# Patient Record
(demographics unavailable — no encounter records)

---

## 2024-11-22 NOTE — PHYSICAL EXAM
[No Acute Distress] : no acute distress [Well Nourished] : well nourished [Well Developed] : well developed [Well-Appearing] : well-appearing [Normal Sclera/Conjunctiva] : normal sclera/conjunctiva [PERRL] : pupils equal round and reactive to light [EOMI] : extraocular movements intact [Normal Oropharynx] : the oropharynx was normal [Supple] : supple [Thyroid Normal, No Nodules] : the thyroid was normal and there were no nodules present [No Respiratory Distress] : no respiratory distress  [No Accessory Muscle Use] : no accessory muscle use [Clear to Auscultation] : lungs were clear to auscultation bilaterally [Normal Rate] : normal rate  [Regular Rhythm] : with a regular rhythm [Normal S1, S2] : normal S1 and S2 [No Murmur] : no murmur heard [No Edema] : there was no peripheral edema [Soft] : abdomen soft [Non Tender] : non-tender [Non-distended] : non-distended [No Masses] : no abdominal mass palpated [Normal Bowel Sounds] : normal bowel sounds [No CVA Tenderness] : no CVA  tenderness [No Spinal Tenderness] : no spinal tenderness [Grossly Normal Strength/Tone] : grossly normal strength/tone [No Rash] : no rash [Normal Gait] : normal gait [Alert and Oriented x3] : oriented to person, place, and time [de-identified] : r arm incision site dry clean intact; csectin incision dry clean intact

## 2024-11-22 NOTE — HISTORY OF PRESENT ILLNESS
[FreeTextEntry1] : nexplanon insertion f/u  [de-identified] : 36F  delivered via pCS at 34wk due to PPROM and transverse breech, pregnancy complicated by GDMA1 and recurrent UTI. Pt delivered via primary C/S on 2024. Baby girl weighed 2170g (4lbs 12oz) APGARS 9/9 sent to NICU due to prematurity. Post partum course complicated with Rapid response the night of  due to hypotension, SOB and hgb 7.2. CT a/p showed 10cm hematomas in b/l rectus sheath, no active bleeding. Pt received 2u pRBC with resolution of symptoms, Hgb 8.4, and the next day repeat Hgb 9.5.  2024 Pt was followed up for initial postpartum visit on 2024. The repeat Hgb was 11.8 (24). The wound healing well, but distension was noted 2/2 hematoma. Urine culture was negative. Pt wanting Nexplanon.  2024 Today in the clinic, pt reports she and her baby doing well. Has good support from  taking care of the baby. Reports  Incision site without pain, discharge, warmth, redness. Reports few episodes of mild dizziness. Reports improvement of vaginal bleeding (lochia) as it is occurring less frequently and in smaller amounts. Mild abdominal pain improving. Pt c/o canker sore which has improved with "Magic Mouthwash" in the past. Denies feeling depressed, SI, HI. Denies fever, chills, n/v, dysuria, urinary frequency/urgency. Pt no longer wants Nexplanon.  24: Reports doing well. Baby also doing well. Baby gaining wt appropriately. Has great support from the . Still has mild discomfort in the abdomen since the c section. Pt also c/o spider veins on thigh bilaterally associated with intermittent pain which worsens with long standing. Pt also reports of veins bulging which is also associated with pain on her leg bilaterally that worsens with long standing. Denies feeling depressed, SI, HI. Denies fever, chills, n/v, dysuria, urinary frequency/urgency. Still undecisive of contraceptive method.  10/1/24 Breast feeding + formula feeding. Good support from . Pt interested in Nexplanon for contraceptive method. Pt report wt gain with depo and complication with IUD (persistent BV). Pt now interested in Nexplanon. Mild abdominal discomfort after lifting baby car seat. Also c/o left wrist pain + swelling x 2 weeks. Pt has not completed the 2hr glc test. Continues to c/o symptomatic spider vein, compression stockings not helping. Report the bilateral leg pain is severe with prolong standing. Denies fever, chills, n/v, urinary frequency/urgency, dysuria.  24. Uncomplicated insertion of nexplanon of right arm.   24: notes no complication post nexplanon insertion. pt reports intermittent dizziness. did not complete the 2hr glc tolerance yet.

## 2024-11-22 NOTE — REVIEW OF SYSTEMS
[Dizziness] : dizziness [Negative] : Heme/Lymph [Chest Pain] : no chest pain [Palpitations] : no palpitations [Lower Ext Edema] : no lower extremity edema [Orthopnea] : no orthopnea [Paroxysmal Nocturnal Dyspnea] : no paroxysmal nocturnal dyspnea [Headache] : no headache [Fainting] : no fainting [Confusion] : no confusion [Unsteady Walking] : no ataxia [FreeTextEntry5] : +spider vein

## 2025-02-19 NOTE — HISTORY OF PRESENT ILLNESS
[FreeTextEntry1] : CPE [de-identified] : 36F  with recent OB hx of pCS (24) at 34wks due to PPROM and transverse breech c/b hematomas in b/l rectus sheath requiring 2 units of blood, pregnancy complicated by GDMA1 and recurrent UTI & BV here for CPE. s/p Nexplanon insertion in 2024. Pt has not completed 2hr glc tolerance test.

## 2025-02-19 NOTE — HISTORY OF PRESENT ILLNESS
[FreeTextEntry1] : CPE [de-identified] : 36F  with recent OB hx of pCS (24) at 34wks due to PPROM and transverse breech c/b hematomas in b/l rectus sheath requiring 2 units of blood, pregnancy complicated by GDMA1 and recurrent UTI & BV here for CPE. s/p Nexplanon insertion in 2024. Pt has not completed 2hr glc tolerance test.

## 2025-02-20 NOTE — HISTORY OF PRESENT ILLNESS
[FreeTextEntry1] : abdominal pain f/u  [de-identified] : 36F  with recent OB hx of pCS (24) at 34wks due to PPROM and transverse breech c/b hematomas in b/l rectus sheath requiring 2 units of blood, pregnancy complicated by GDMA1 and recurrent UTI & BV here for:  #abdominal pain: pt reports she continues to have abdominal pain since the csection. the pain comes and goes but it becomes severe enough to affect daily activity. pt reports the pain is worse when lifting heavy objects. No warmth, TTP, or discharge on or around the incision site of c section. Denies n/v, fever, chills, constipation, dark/bloody stool.   #hematuria: pt reports few episodes of bloody urine associated with urinary frequency.  no colicky pain. no dysuria. no urgency.   #b/l wrist pain: reports wrist pain since the delivery in . pain is worse after holding the baby.   #lower back pain: pt reports of worsening lower back pain past few weeks. pt reports she had a falling accident 15 years ago and was told that she had a disc protrusion and was recommended for surgery which pt refused at the time. pt has been having intermittent lower back pain since the accident, but it worsened after the delivery . Pt has been taking tylenol 650mg twice a day but hasn't helped much. Denies saddle anesthesia, bladder/bowel incontinence, LE weakness.   #nexplanon in place: pt desires to have nexplanon removed as she is unhappy with wt gain.

## 2025-02-20 NOTE — REVIEW OF SYSTEMS
[Recent Change In Weight] : ~T recent weight change [Abdominal Pain] : abdominal pain [Hematuria] : hematuria [Frequency] : frequency [Back Pain] : back pain [Fever] : no fever [Chills] : no chills [Discharge] : no discharge [Pain] : no pain [Vision Problems] : no vision problems [Nasal Discharge] : no nasal discharge [Sore Throat] : no sore throat [Postnasal Drip] : no postnasal drip [Chest Pain] : no chest pain [Palpitations] : no palpitations [Lower Ext Edema] : no lower extremity edema [Shortness Of Breath] : no shortness of breath [Cough] : no cough [Dyspnea on Exertion] : no dyspnea on exertion [Nausea] : no nausea [Constipation] : no constipation [Diarrhea] : diarrhea [Vomiting] : no vomiting [Heartburn] : no heartburn [Melena] : no melena [Dysuria] : no dysuria [Incontinence] : no incontinence [Nocturia] : no nocturia [Vaginal Discharge] : no vaginal discharge [Itching] : no itching [Skin Rash] : no skin rash [Headache] : no headache [FreeTextEntry9] : +b/l wrist pain

## 2025-02-20 NOTE — HISTORY OF PRESENT ILLNESS
[FreeTextEntry1] : abdominal pain f/u  [de-identified] : 36F  with recent OB hx of pCS (24) at 34wks due to PPROM and transverse breech c/b hematomas in b/l rectus sheath requiring 2 units of blood, pregnancy complicated by GDMA1 and recurrent UTI & BV here for:  #abdominal pain: pt reports she continues to have abdominal pain since the csection. the pain comes and goes but it becomes severe enough to affect daily activity. pt reports the pain is worse when lifting heavy objects. No warmth, TTP, or discharge on or around the incision site of c section. Denies n/v, fever, chills, constipation, dark/bloody stool.   #hematuria: pt reports few episodes of bloody urine associated with urinary frequency.  no colicky pain. no dysuria. no urgency.   #b/l wrist pain: reports wrist pain since the delivery in . pain is worse after holding the baby.   #lower back pain: pt reports of worsening lower back pain past few weeks. pt reports she had a falling accident 15 years ago and was told that she had a disc protrusion and was recommended for surgery which pt refused at the time. pt has been having intermittent lower back pain since the accident, but it worsened after the delivery . Pt has been taking tylenol 650mg twice a day but hasn't helped much. Denies saddle anesthesia, bladder/bowel incontinence, LE weakness.   #nexplanon in place: pt desires to have nexplanon removed as she is unhappy with wt gain.

## 2025-02-20 NOTE — PHYSICAL EXAM
[No Acute Distress] : no acute distress [Well Nourished] : well nourished [Well Developed] : well developed [Well-Appearing] : well-appearing [Normal Sclera/Conjunctiva] : normal sclera/conjunctiva [PERRL] : pupils equal round and reactive to light [EOMI] : extraocular movements intact [Normal Oropharynx] : the oropharynx was normal [Supple] : supple [Thyroid Normal, No Nodules] : the thyroid was normal and there were no nodules present [No Respiratory Distress] : no respiratory distress  [No Accessory Muscle Use] : no accessory muscle use [Clear to Auscultation] : lungs were clear to auscultation bilaterally [Normal Rate] : normal rate  [Regular Rhythm] : with a regular rhythm [Normal S1, S2] : normal S1 and S2 [No Murmur] : no murmur heard [No Edema] : there was no peripheral edema [Soft] : abdomen soft [Non Tender] : non-tender [Non-distended] : non-distended [No Masses] : no abdominal mass palpated [Normal Bowel Sounds] : normal bowel sounds [No CVA Tenderness] : no CVA  tenderness [No Spinal Tenderness] : no spinal tenderness [No Joint Swelling] : no joint swelling [Grossly Normal Strength/Tone] : grossly normal strength/tone [No Rash] : no rash [Normal Gait] : normal gait [Alert and Oriented x3] : oriented to person, place, and time [de-identified] : no TTP on wrist b/l, Finkelsteins, Tinels, Phalens negative

## 2025-02-20 NOTE — PHYSICAL EXAM
[No Acute Distress] : no acute distress [Well Nourished] : well nourished [Well Developed] : well developed [Well-Appearing] : well-appearing [Normal Sclera/Conjunctiva] : normal sclera/conjunctiva [PERRL] : pupils equal round and reactive to light [EOMI] : extraocular movements intact [Normal Oropharynx] : the oropharynx was normal [Supple] : supple [Thyroid Normal, No Nodules] : the thyroid was normal and there were no nodules present [No Respiratory Distress] : no respiratory distress  [No Accessory Muscle Use] : no accessory muscle use [Clear to Auscultation] : lungs were clear to auscultation bilaterally [Normal Rate] : normal rate  [Regular Rhythm] : with a regular rhythm [Normal S1, S2] : normal S1 and S2 [No Murmur] : no murmur heard [No Edema] : there was no peripheral edema [Soft] : abdomen soft [Non Tender] : non-tender [Non-distended] : non-distended [No Masses] : no abdominal mass palpated [Normal Bowel Sounds] : normal bowel sounds [No CVA Tenderness] : no CVA  tenderness [No Spinal Tenderness] : no spinal tenderness [No Joint Swelling] : no joint swelling [Grossly Normal Strength/Tone] : grossly normal strength/tone [No Rash] : no rash [Normal Gait] : normal gait [Alert and Oriented x3] : oriented to person, place, and time [de-identified] : no TTP on wrist b/l, Finkelsteins, Tinels, Phalens negative

## 2025-03-14 NOTE — PHYSICAL EXAM
[No Acute Distress] : no acute distress [Well Nourished] : well nourished [Well Developed] : well developed [Well-Appearing] : well-appearing [Normal Sclera/Conjunctiva] : normal sclera/conjunctiva [PERRL] : pupils equal round and reactive to light [EOMI] : extraocular movements intact [Normal Oropharynx] : the oropharynx was normal [Supple] : supple [Thyroid Normal, No Nodules] : the thyroid was normal and there were no nodules present [No Respiratory Distress] : no respiratory distress  [No Accessory Muscle Use] : no accessory muscle use [Clear to Auscultation] : lungs were clear to auscultation bilaterally [Normal Rate] : normal rate  [Regular Rhythm] : with a regular rhythm [Normal S1, S2] : normal S1 and S2 [No Murmur] : no murmur heard [No Edema] : there was no peripheral edema [Soft] : abdomen soft [Non Tender] : non-tender [Non-distended] : non-distended [No Masses] : no abdominal mass palpated [Normal Bowel Sounds] : normal bowel sounds [No CVA Tenderness] : no CVA  tenderness [No Joint Swelling] : no joint swelling [Grossly Normal Strength/Tone] : grossly normal strength/tone [No Rash] : no rash [Normal Gait] : normal gait [Alert and Oriented x3] : oriented to person, place, and time

## 2025-03-14 NOTE — HISTORY OF PRESENT ILLNESS
[FreeTextEntry1] : cpe  [de-identified] : 36F  with recent OB hx of pCS (24) at 34wks due to PPROM and transverse breech c/b hematomas in b/l rectus sheath requiring 2 units of blood, pregnancy complicated by GDMA1 and recurrent UTI & BV here for CPE. s/p Nexplanon insertion in 2024. Pt has not completed 2hr glc tolerance test.  #abd pain: seen on  c/o abd pain since the csection.  CT A/P showing probable hemorrhagic cyst in the left adnexa.  TVUS and Pelvic US was ordered which showed 3.5 cm simple left ovarian cyst and 1.1 cm echogenic lesion noted in the left posterior fundus myometrium, could be a polyp. pt reports the pain has improved. denies n/v, f/c.

## 2025-03-14 NOTE — HEALTH RISK ASSESSMENT
[Fair] :  ~his/her~ mood as fair [No] : No [0] : 2) Feeling down, depressed, or hopeless: Not at all (0) [PHQ-2 Negative - No further assessment needed] : PHQ-2 Negative - No further assessment needed [Never] : Never [With Family] : lives with family [Employed] : employed [] :  [# Of Children ___] : has [unfilled] children [Sexually Active] : sexually active [Feels Safe at Home] : Feels safe at home [Fully functional (bathing, dressing, toileting, transferring, walking, feeding)] : Fully functional (bathing, dressing, toileting, transferring, walking, feeding) [Fully functional (using the telephone, shopping, preparing meals, housekeeping, doing laundry, using] : Fully functional and needs no help or supervision to perform IADLs (using the telephone, shopping, preparing meals, housekeeping, doing laundry, using transportation, managing medications and managing finances) [Reports normal functional visual acuity (ie: able to read med bottle)] : Reports normal functional visual acuity [de-identified] : no  [de-identified] : eating lot of carbs  [[id number]] : 0 [High Risk Behavior] : no high risk behavior [Reports changes in hearing] : Reports no changes in hearing [Reports changes in vision] : Reports no changes in vision [Reports changes in dental health] : Reports no changes in dental health [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

## 2025-03-14 NOTE — HEALTH RISK ASSESSMENT
[Fair] :  ~his/her~ mood as fair [No] : No [0] : 2) Feeling down, depressed, or hopeless: Not at all (0) [PHQ-2 Negative - No further assessment needed] : PHQ-2 Negative - No further assessment needed [Never] : Never [With Family] : lives with family [Employed] : employed [] :  [# Of Children ___] : has [unfilled] children [Sexually Active] : sexually active [Feels Safe at Home] : Feels safe at home [Fully functional (bathing, dressing, toileting, transferring, walking, feeding)] : Fully functional (bathing, dressing, toileting, transferring, walking, feeding) [Fully functional (using the telephone, shopping, preparing meals, housekeeping, doing laundry, using] : Fully functional and needs no help or supervision to perform IADLs (using the telephone, shopping, preparing meals, housekeeping, doing laundry, using transportation, managing medications and managing finances) [Reports normal functional visual acuity (ie: able to read med bottle)] : Reports normal functional visual acuity [de-identified] : no  [de-identified] : eating lot of carbs  [[id number]] : 0 [High Risk Behavior] : no high risk behavior [Reports changes in hearing] : Reports no changes in hearing [Reports changes in vision] : Reports no changes in vision [Reports changes in dental health] : Reports no changes in dental health [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

## 2025-03-14 NOTE — HISTORY OF PRESENT ILLNESS
[FreeTextEntry1] : cpe  [de-identified] : 36F  with recent OB hx of pCS (24) at 34wks due to PPROM and transverse breech c/b hematomas in b/l rectus sheath requiring 2 units of blood, pregnancy complicated by GDMA1 and recurrent UTI & BV here for CPE. s/p Nexplanon insertion in 2024. Pt has not completed 2hr glc tolerance test.  #abd pain: seen on  c/o abd pain since the csection.  CT A/P showing probable hemorrhagic cyst in the left adnexa.  TVUS and Pelvic US was ordered which showed 3.5 cm simple left ovarian cyst and 1.1 cm echogenic lesion noted in the left posterior fundus myometrium, could be a polyp. pt reports the pain has improved. denies n/v, f/c.

## 2025-03-14 NOTE — HISTORY OF PRESENT ILLNESS
[FreeTextEntry1] : cpe  [de-identified] : 36F  with recent OB hx of pCS (24) at 34wks due to PPROM and transverse breech c/b hematomas in b/l rectus sheath requiring 2 units of blood, pregnancy complicated by GDMA1 and recurrent UTI & BV here for CPE. s/p Nexplanon insertion in 2024. Pt has not completed 2hr glc tolerance test.  #abd pain: seen on  c/o abd pain since the csection.  CT A/P showing probable hemorrhagic cyst in the left adnexa.  TVUS and Pelvic US was ordered which showed 3.5 cm simple left ovarian cyst and 1.1 cm echogenic lesion noted in the left posterior fundus myometrium, could be a polyp. pt reports the pain has improved. denies n/v, f/c.

## 2025-03-14 NOTE — HEALTH RISK ASSESSMENT
[Fair] :  ~his/her~ mood as fair [No] : No [0] : 2) Feeling down, depressed, or hopeless: Not at all (0) [PHQ-2 Negative - No further assessment needed] : PHQ-2 Negative - No further assessment needed [Never] : Never [With Family] : lives with family [Employed] : employed [] :  [# Of Children ___] : has [unfilled] children [Sexually Active] : sexually active [Feels Safe at Home] : Feels safe at home [Fully functional (bathing, dressing, toileting, transferring, walking, feeding)] : Fully functional (bathing, dressing, toileting, transferring, walking, feeding) [Fully functional (using the telephone, shopping, preparing meals, housekeeping, doing laundry, using] : Fully functional and needs no help or supervision to perform IADLs (using the telephone, shopping, preparing meals, housekeeping, doing laundry, using transportation, managing medications and managing finances) [Reports normal functional visual acuity (ie: able to read med bottle)] : Reports normal functional visual acuity [de-identified] : no  [de-identified] : eating lot of carbs  [[id number]] : 0 [High Risk Behavior] : no high risk behavior [Reports changes in hearing] : Reports no changes in hearing [Reports changes in vision] : Reports no changes in vision [Reports changes in dental health] : Reports no changes in dental health [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

## 2025-03-14 NOTE — REVIEW OF SYSTEMS
[Recent Change In Weight] : ~T recent weight change [Abdominal Pain] : abdominal pain [Fever] : no fever [Chills] : no chills [Discharge] : no discharge [Pain] : no pain [Vision Problems] : no vision problems [Nasal Discharge] : no nasal discharge [Sore Throat] : no sore throat [Postnasal Drip] : no postnasal drip [Chest Pain] : no chest pain [Palpitations] : no palpitations [Lower Ext Edema] : no lower extremity edema [Shortness Of Breath] : no shortness of breath [Cough] : no cough [Dyspnea on Exertion] : no dyspnea on exertion [Nausea] : no nausea [Constipation] : no constipation [Diarrhea] : diarrhea [Vomiting] : no vomiting [Heartburn] : no heartburn [Melena] : no melena [Dysuria] : no dysuria [Incontinence] : no incontinence [Nocturia] : no nocturia [Hematuria] : no hematuria [Frequency] : no frequency [Vaginal Discharge] : no vaginal discharge [Skin Rash] : no skin rash [Headache] : no headache [Dizziness] : no dizziness